# Patient Record
Sex: MALE
[De-identification: names, ages, dates, MRNs, and addresses within clinical notes are randomized per-mention and may not be internally consistent; named-entity substitution may affect disease eponyms.]

---

## 2020-07-18 ENCOUNTER — HOSPITAL ENCOUNTER (EMERGENCY)
Dept: HOSPITAL 97 - ER | Age: 40
LOS: 1 days | Discharge: ANOTHER HOSPITAL | End: 2020-07-19
Payer: SELF-PAY

## 2020-07-18 DIAGNOSIS — F17.210: ICD-10-CM

## 2020-07-18 DIAGNOSIS — S65.112A: Primary | ICD-10-CM

## 2020-07-18 DIAGNOSIS — I10: ICD-10-CM
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
## 2020-07-18 DIAGNOSIS — Z23: ICD-10-CM
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
## 2020-07-18 PROCEDURE — 99285 EMERGENCY DEPT VISIT HI MDM: CPT

## 2020-07-18 PROCEDURE — 86900 BLOOD TYPING SEROLOGIC ABO: CPT

## 2020-07-18 PROCEDURE — 96374 THER/PROPH/DIAG INJ IV PUSH: CPT

## 2020-07-18 PROCEDURE — 96361 HYDRATE IV INFUSION ADD-ON: CPT

## 2020-07-18 PROCEDURE — 85025 COMPLETE CBC W/AUTO DIFF WBC: CPT

## 2020-07-18 PROCEDURE — 90471 IMMUNIZATION ADMIN: CPT

## 2020-07-18 PROCEDURE — 80048 BASIC METABOLIC PNL TOTAL CA: CPT

## 2020-07-18 PROCEDURE — 85610 PROTHROMBIN TIME: CPT

## 2020-07-18 PROCEDURE — 90714 TD VACC NO PRESV 7 YRS+ IM: CPT

## 2020-07-18 PROCEDURE — 86850 RBC ANTIBODY SCREEN: CPT

## 2020-07-18 PROCEDURE — 36415 COLL VENOUS BLD VENIPUNCTURE: CPT

## 2020-07-18 PROCEDURE — 86901 BLOOD TYPING SEROLOGIC RH(D): CPT
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
## 2020-07-19 LAB
BUN BLD-MCNC: 11 MG/DL (ref 7–18)
GLUCOSE SERPLBLD-MCNC: 112 MG/DL (ref 74–106)
HCT VFR BLD CALC: 46 % (ref 39.6–49)
INR BLD: 1.08
LYMPHOCYTES # SPEC AUTO: 2.5 K/UL (ref 0.7–4.9)
PMV BLD: 8.8 FL (ref 7.6–11.3)
POTASSIUM SERPL-SCNC: 3.5 MMOL/L (ref 3.5–5.1)
RBC # BLD: 4.91 M/UL (ref 4.33–5.43)

## 2020-07-19 NOTE — RAD REPORT
EXAM DESCRIPTION:  RAD - Wrist Left 3 View - 7/19/2020 1:05 am

 

CLINICAL HISTORY:  laceration

Pain

 

COMPARISON:  No comparisons

 

FINDINGS:  Soft tissue swelling is present along the wrist region.  No acute fracture or foreign body
 seen.

## 2020-07-19 NOTE — ER
Nurse's Notes                                                                                     

 [facility]                                                                 

Name: Kevin Cosme                                                                                   

Age: 40 yrs                                                                                       

Sex: Male                                                                                         

: 1980                                                                                   

MRN: [id number]                                                                                   

Arrival Date: 2020                                                                          

Time: 23:25                                                                                       

Account#: [id number]                                                                            

Bed 3                                                                                             

Private MD:                                                                                       

Diagnosis: Laceration without foreign body of left wrist;Laceration of radial artery at wrist and 

  hand level of left arm                                                                          

                                                                                                  

Presentation:                                                                                     

                                                                                             

23:36 Chief complaint: Patient states: Slipped on [place] 20 min PTA. Laceration to left  ll1 

      hand, bleeding continuous. Pressure dressing applied, bleeding slowed. PMS intact.          

      Coronavirus screen: Patient denies a cough. Patient denies shortness of breath or           

      difficulty breathing. Patient denies measured and/or subjective temperature greater         

      than 100.4F prior to today's visit. Patient denies travel on a cruise ship or to a          

      country the [facility] currently lists as an affected area. Patient denies contact with known      

      and/or suspected case of COVID-19. Proceed with normal triage. Ebola Screen: Patient        

      denies travel to an Ebola-affected area in the 21 days before illness onset.                

      Complicating Factors: The patient fell landing on an outstretched hand. Initial Sepsis      

      Screen: Does the patient meet any 2 criteria? HR > 90 bpm. No. Patient's initial sepsis     

      screen is negative. Risk Assessment: Do you want to hurt yourself or someone else?          

      Patient reports no desire to harm self or others. Onset of symptoms was 2020.      

23:36 Method Of Arrival: Ambulatory                                                           ll1 

23:36 Acuity: CHRIS 3                                                                           ll1 

                                                                                                  

Historical:                                                                                       

- Allergies:                                                                                      

23:39 No Known Allergies;                                                                     ll1 

- PMHx:                                                                                           

23:39 Hypertension;                                                                           ll1 

- PSHx:                                                                                           

23:39 Cholecystectomy;                                                                        ll1 

                                                                                                  

- Immunization history:: Last tetanus immunization: unknown.                                      

- Social history:: Smoking status: Patient reports the use of cigarette tobacco                   

  products, smokes one pack cigarettes per day. Patient uses alcohol, only on a social            

  basis. Patient/guardian denies using street drugs.                                              

                                                                                                  

                                                                                                  

Screenin:30 Abuse screen: Denies threats or abuse. Nutritional screening: No deficits noted.        jb4 

      Tuberculosis screening: No symptoms or risk factors identified. Fall Risk None              

      identified.                                                                                 

                                                                                                  

Assessment:                                                                                       

23:30 General: Appears in no apparent distress. uncomfortable, Behavior is calm, cooperative, jb4 

      appropriate for age. Pain: Complains of pain in lateral aspect of left wrist Pain does      

      not radiate. Pain currently is 7 out of 10 on a pain scale. Quality of pain is              

      described as sharp. Neuro: Level of Consciousness is awake, alert, obeys commands,          

      Oriented to person, place, time, situation. Cardiovascular: Patient's skin is warm and      

      dry. Respiratory: Airway is patent Respiratory effort is even, unlabored, Respiratory       

      pattern is regular, symmetrical. GI: No signs and/or symptoms were reported involving       

      the gastrointestinal system. : No signs and/or symptoms were reported regarding the       

      genitourinary system. EENT: No signs and/or symptoms were reported regarding the EENT       

      system. Derm: Skin is dry, Skin is normal, Skin temperature is warm. Musculoskeletal:       

      Circulation, motion, and sensation intact. Range of motion: intact in all extremities.      

      Injury Description: Laceration sustained to palmar aspect of left wrist is 0.5 to 2.5       

      cm long, bleeding moderately, was sustained 30-60 minutes ago. is bleeding moderately a     

      dressing was applied.                                                                       

23:35 Reassessment: pt family called, requesting update on pt status, informed pt family that sg  

      pt is being evaluated by the provider at this time to assess for type of bleeding and       

      injury, pt family stated understanding.                                                     

                                                                                             

00:30 Reassessment: Patient appears in no apparent distress at this time. Patient and/or      jb4 

      family updated on plan of care and expected duration. Pain level reassessed. Patient is     

      alert, oriented x 3, equal unlabored respirations, skin warm/dry/pink. Laceration           

      continues to exhibit a moderate amount of pulsatile bleeding.                               

01:16 Reassessment: Report given to MOHSEN Waldrop at [facility].                           jb4 

01:56 Reassessment: Patient appears in no apparent distress at this time. Patient and/or      jb4 

      family updated on plan of care and expected duration. Pain level reassessed. Patient is     

      alert, oriented x 3, equal unlabored respirations, skin warm/dry/pink. PT transferred       

      out of ED Via  EMS. IV is patent and with NS 0.9 infusing freely. Pt has full range       

      of motion of his left fingers and left hand. Denies numbness or tingling. Has strong        

       in the left wrist.                                                                     

                                                                                                  

Vital Signs:                                                                                      

                                                                                             

23:36  / 101; Pulse 102; Resp 18; Temp 98.0; Pulse Ox 98% ; Pain 7/10;                  ll1 

                                                                                             

00:18 Weight 93.44 kg;                                                                        mw2 

00:30  / 100; Pulse 82; Resp 16; Pulse Ox 97% on R/A;                                   jb4 

01:42  / 98; Pulse 79; Resp 16; Pulse Ox 96% on R/A;                                    jb4 

                                                                                                  

ED Course:                                                                                        

                                                                                             

23:25 Patient arrived in ED.                                                                  do  

23:29 Lupillo Mcintyre PA is PHCP.                                                                cp  

23:29 Maico Singh MD is Attending Physician.                                             cp  

23:30 Stefano Casper, RN is Primary Nurse.                                                     jb4 

23:30 Patient has correct armband on for positive identification. Bed in low position. Call   jb4 

      light in reach. Side rails up X 1. Pulse ox on. NIBP on.                                    

23:38 Triage completed.                                                                       ll1 

23:39 Arm band placed on Patient placed in an exam room, on a stretcher.                      ll1 

                                                                                             

00:30 Initial lab(s) drawn, by me, sent to lab. Inserted saline lock: 18 gauge in right       rv  

      forearm, using aseptic technique. Blood collected.                                          

01:05 XRAY Wrist LEFT 3 view In Process Unspecified.                                          EDMS

01:59 No provider procedures requiring assistance completed. Patient transferred, IV remains  jb4 

      in place.                                                                                   

                                                                                                  

Administered Medications:                                                                         

00:25 Drug: Ancef 1 grams Route: IVPB; Site: right forearm;                                   jb4 

00:28 Follow up: Response: No adverse reaction; IV Status: Completed infusion                 jb4 

00:25 Drug: NS 0.9% 1000 ml Route: IV; Rate: 1 bolus; Site: right forearm;                    jb4 

01:15 Follow up: Response: No adverse reaction; IV Status: Completed infusion; IV Intake:     jb4 

      1000ml                                                                                      

00:30 Drug: Tetanus-Diphtheria Toxoid Adult 0.5 ml {: [employer]. Exp:         rv  

      2022. Lot #: [id number]. } Route: IM; Site: right deltoid;                                 

01:00 Follow up: Response: No adverse reaction                                                jb4 

00:44 Not Given (Patient Refused): morphine 4 mg IVP once; RASS on ADMIN: Combtv4, Very       jb4 

      Agttd3, Agttd2, Rstlss1, AlertClm0, Drwsy-1, Lt Sdtn-2, Mod Sdtn-3, Dp Sdtn-4,              

      UnArsble-5                                                                                  

01:55 Drug: NS 0.9% 1000 ml Route: IV; Rate: 1 bolus; Site: right forearm;                    jb4 

02:03 Follow up: Response: No adverse reaction; IV Status: Infusion continued upon transfer   jb4 

                                                                                                  

                                                                                                  

Intake:                                                                                           

01:15 IV: 1000ml; Total: 1000ml.                                                              jb4 

                                                                                                  

Outcome:                                                                                          

00:47 ER care complete, transfer ordered by MD. henriquez  

01:59 Transferred by ground EMS [facility]. to [facility], Transfer form completed.     jb4 

      X-rays sent w/ patient.                                                                     

01:59 Condition: stable                                                                           

01:59 Discharge instructions given to patient, Instructed on the need for transfer,               

      Demonstrated understanding of instructions.                                                 

02:03 Patient left the ED.                                                                    jb4 

                                                                                                  

Signatures:                                                                                       

Dispatcher [employer]                           EDMS                                                 

Erwin Reed, RN                         RN   Lupillo Wallis PA PA cp Ogletree, Danielle do Bryson, James RN                       RN   jb4                                                  

Shaji Walsh                            2                                                  

Sanket Arrington RN RN rv Lewis, Lynsay, RN                       RN   ll1                                                  

                                                                                                  

Corrections: (The following items were deleted from the chart)                                    

00:59 00:30 Reassessment: Patient appears in no apparent distress at this time. Patient       jb4 

      and/or family updated on plan of care and expected duration. Pain level reassessed.         

      Patient is alert, oriented x 3, equal unlabored respirations, skin warm/dry/pink.           

      Laceration is still oozing blood. Pressure bandage reaplied. jb4                            

                                                                                                  

**************************************************************************************************

## 2020-07-19 NOTE — EDPHYS
Physician Documentation                                                                           

 [facility]                                                                 

Name: Kevin Cosme                                                                                   

Age: 40 yrs                                                                                       

Sex: Male                                                                                         

: 1980                                                                                   

MRN: [id number]                                                                                   

Arrival Date: 2020                                                                          

Time: 23:25                                                                                       

Account#: [id number]                                                                            

Bed 3                                                                                             

Private MD:                                                                                       

ED Physician Maico Singh                                                                      

HPI:                                                                                              

                                                                                             

23:50 This 40 yrs old  Male presents to ER via Ambulatory with complaints of          cp  

      Laceration To Arm.                                                                          

23:50 The patient or guardian reports a laceration, irregular. Context: [place]       [place]  

      [place]                                           

23:50 Onset: The symptoms/episode began/occurred just prior to arrival. Associated signs and  cp  

      symptoms: Pertinent negatives: cyanosis distally, decreased sensation distally,             

      numbness distally.                                                                          

                                                                                                  

Historical:                                                                                       

- Allergies:                                                                                      

23:39 No Known Allergies;                                                                     ll1 

- PMHx:                                                                                           

23:39 Hypertension;                                                                           ll1 

- PSHx:                                                                                           

23:39 Cholecystectomy;                                                                        ll1 

                                                                                                  

- Immunization history:: Last tetanus immunization: unknown.                                      

- Social history:: Smoking status: Patient reports the use of cigarette tobacco                   

  products, smokes one pack cigarettes per day. Patient uses alcohol, only on a social            

  basis. Patient/guardian denies using street drugs.                                              

                                                                                                  

                                                                                                  

ROS:                                                                                              

23:55 Constitutional: Negative for body aches, chills, fever.                                 cp  

23:55 Cardiovascular: Negative for chest pain, palpitations.                                      

23:55 Respiratory: Negative for cough, shortness of breath, wheezing.                             

23:55 Abdomen/GI: Negative for abdominal pain.                                                    

23:55 Skin: Positive for laceration(s), of the volar and radial side of left wrist.               

23:55 Neuro: Negative for numbness, tingling, weakness.                                           

23:55 All other systems are negative.                                                             

                                                                                                  

Exam:                                                                                             

23:59 Constitutional: The patient appears in no acute distress, alert, awake, well developed, cp  

      well nourished.                                                                             

23:59 Head/Face:  Normocephalic, atraumatic.                                                  cp  

23:59 Chest/axilla: Inspection: normal.                                                           

23:59 Cardiovascular: Rate: tachycardic, Rhythm: regular, Pulses: Pulses are 2+ in right          

      radial artery and left radial artery.                                                       

23:59 Respiratory: the patient does not display signs of respiratory distress,  Respirations:     

      normal, no use of accessory muscles, no retractions.                                        

23:59 Abdomen/GI: Inspection: abdomen appears normal.                                             

23:59 Musculoskeletal/extremity: ROM: full active range of motion, in the left wrist and left     

      hand, Sensation intact.                                                                     

23:59 Skin: injury, laceration(s), the wound is approximately  2.5 cm(s), of the volar and        

      radial side of left wrist, that can be described as clean, irregular, with moderate         

      bleeding, bleeding appears pulsating and stops when pressure applied to proximal radial     

      artery.                                                                                     

23:59 Neuro: Orientation: to person, place \T\ time. Mentation: is normal.                        

                                                                                                  

Vital Signs:                                                                                      

23:36  / 101; Pulse 102; Resp 18; Temp 98.0; Pulse Ox 98% ; Pain 7/10;                  ll1 

                                                                                             

00:18 Weight 93.44 kg;                                                                        mw2 

00:30  / 100; Pulse 82; Resp 16; Pulse Ox 97% on R/A;                                   jb4 

01:42  / 98; Pulse 79; Resp 16; Pulse Ox 96% on R/A;                                    jb4 

                                                                                                  

MDM:                                                                                              

18                                                                                             

23:43 Patient medically screened.                                                               

                                                                                             

00:20 Differential diagnosis: open fracture, closed fracture, contusion, radial artery        cp  

      laceration.                                                                                 

00:45 Data reviewed: vital signs, nurses notes, I have discussed the patient's                  

      presentation/case with the attending Emergency Department Physician; and as a result, I     

      will transfer patient.                                                                      

00:50 Physician consultation: was contacted at 00:35, regarding regarding transfer, to        [facility]. patient's condition, accepting physician will be DR Palacio.               

                                                                                                  

                                                                                             

00:15 Order name: CBC with Diff; Complete Time: 01:49                                           

                                                                                             

00:15 Order name: BMP; Complete Time: 01:49                                                   cp  

                                                                                             

00:07 Order name: XRAY Wrist LEFT 3 view                                                        

                                                                                             

00:32 Order name: Type And Screen                                                               

                                                                                             

00:32 Order name: PT-INR; Complete Time: 01:49                                                cp  

                                                                                             

00:07 Order name: IV; Complete Time: 00:30                                                    cp  

                                                                                             

00:38 Order name: NPO; Complete Time: 00:43                                                   cp  

                                                                                                  

Administered Medications:                                                                         

00:25 Drug: Ancef 1 grams Route: IVPB; Site: right forearm;                                   jb4 

00:28 Follow up: Response: No adverse reaction; IV Status: Completed infusion                 jb4 

00:25 Drug: NS 0.9% 1000 ml Route: IV; Rate: 1 bolus; Site: right forearm;                    jb4 

01:15 Follow up: Response: No adverse reaction; IV Status: Completed infusion; IV Intake:     jb4 

      1000ml                                                                                      

00:30 Drug: Tetanus-Diphtheria Toxoid Adult 0.5 ml {: [employer]. Exp:         rv  

      2022. Lot #: [id number]. } Route: IM; Site: right deltoid;                                 

01:00 Follow up: Response: No adverse reaction                                                jb4 

00:44 Not Given (Patient Refused): morphine 4 mg IVP once; RASS on ADMIN: Combtv4, Very       jb4 

      Agttd3, Agttd2, Rstlss1, AlertClm0, Drwsy-1, Lt Sdtn-2, Mod Sdtn-3, Dp Sdtn-4,              

      UnArsble-5                                                                                  

01:55 Drug: NS 0.9% 1000 ml Route: IV; Rate: 1 bolus; Site: right forearm;                    jb4 

02:03 Follow up: Response: No adverse reaction; IV Status: Infusion continued upon transfer   jb4 

                                                                                                  

                                                                                                  

Disposition:                                                                                      

01:00 Chart complete.                                                                         cp  

06:16 Co-signature as Attending Physician, Maico Singh MD.                                 7 

                                                                                                  

Disposition:                                                                                      

20 00:47 Transfer ordered to [facility]. Diagnosis are Laceration without      

  foreign body of left wrist, Laceration of radial artery at wrist and hand                       

  level of left arm.                                                                              

- Reason for transfer: Higher level of care.                                                      

- Accepting physician is DR Palacio.                                                          

- Condition is Stable.                                                                            

- Problem is new.                                                                                 

- Symptoms have improved.                                                                         

                                                                                                  

                                                                                                  

                                                                                                  

Signatures:                                                                                       

Dispatcher [employer]                           EDMS                                                 

Lupillo Mcintyre PA PA cp Bryson, James RN                       RN   jb4                                                  

Sanket Arrington RN RN rv Lewis, Lynsay RN                       MOHSEN   1                                                  

Maico Singh MD MD   7                                                  

                                                                                                  

Corrections: (The following items were deleted from the chart)                                    

02:03 00:47 2020 00:47 Transfer ordered to [facility]. Diagnosis is        jb4 

      Laceration without foreign body of left wrist; Laceration of radial artery at wrist and     

      hand level of left arm. Reason for transfer: Higher level of care. Accepting physician      

      is DR Palacio. Condition is Stable. Problem is new. Symptoms have improved. florence          

18:19 00:50 Physician consultation: was contacted at 00:35, regarding regarding transfer, to  [facility]. patient's condition, accepting physician will be florence MUÑOZ                       

                                                                                                  

**************************************************************************************************

## 2025-01-03 ENCOUNTER — HOSPITAL ENCOUNTER (EMERGENCY)
Dept: HOSPITAL 97 - ER | Age: 45
Discharge: HOME | End: 2025-01-03
Payer: COMMERCIAL

## 2025-01-03 VITALS — DIASTOLIC BLOOD PRESSURE: 76 MMHG | SYSTOLIC BLOOD PRESSURE: 139 MMHG | OXYGEN SATURATION: 97 %

## 2025-01-03 VITALS — TEMPERATURE: 98.1 F

## 2025-01-03 DIAGNOSIS — F17.210: ICD-10-CM

## 2025-01-03 DIAGNOSIS — J02.9: Primary | ICD-10-CM

## 2025-01-03 PROCEDURE — 93005 ELECTROCARDIOGRAM TRACING: CPT

## 2025-01-03 PROCEDURE — 71046 X-RAY EXAM CHEST 2 VIEWS: CPT

## 2025-01-03 PROCEDURE — 99283 EMERGENCY DEPT VISIT LOW MDM: CPT

## 2025-01-03 NOTE — ER
Nurse's Notes                                                                                     

 [facility]                                                                 

Name: Kevin Cosme                                                                                   

Age: 44 yrs                                                                                       

Sex: Male                                                                                         

: 1980                                                                                   

MRN: [id number]                                                                                   

Arrival Date: 2025                                                                          

Time: 15:07                                                                                       

Account#: [id number]                                                                            

Bed 9                                                                                             

Private MD:                                                                                       

Diagnosis: Acute pharyngitis, unspecified                                                         

                                                                                                  

Presentation:                                                                                     

                                                                                             

15:22 Chief complaint: Patient states: FEELS LIKE INHALED AND SWALLOWED INSULATION WHILE IN   db  

      ATTIC. FEELS LIKE HAS DIFFICULTY BREATHING BECAUSE OF IT. Coronavirus screen: Client        

      denies travel out of the [place] in the last 14 days. At this time, the client does not        

      indicate any symptoms associated with coronavirus-19. Ebola Screen: Patient negative        

      for fever greater than or equal to 101.5 degrees Fahrenheit, and additional compatible      

      Ebola Virus Disease symptoms Patient denies exposure to infectious person. Patient          

      denies travel to an Ebola-affected area in the 21 days before illness onset. No             

      symptoms or risks identified at this time. Initial Sepsis Screen: Does the patient meet     

      any 2 criteria? No. Patient's initial sepsis screen is negative. Does the patient have      

      a suspected source of infection? No. Patient's initial sepsis screen is negative. Risk      

      Assessment: Do you want to hurt yourself or someone else? Patient reports no desire to      

      harm self or others. Onset of symptoms was 2025.                                

15:22 Method Of Arrival: Ambulatory                                                           db  

15:22 Acuity: CHRIS 3                                                                           db  

                                                                                                  

Triage Assessment:                                                                                

15:23 General: Appears in no apparent distress. uncomfortable, Behavior is calm, cooperative. db  

      Pain: Denies pain. Neuro: Level of Consciousness is awake, alert, obeys commands,           

      Oriented to person, place, time. Respiratory: Reports shortness of breath Onset: The        

      symptoms/episode began/occurred suddenly, the patient has mild shortness of breath.         

                                                                                                  

Historical:                                                                                       

- Allergies:                                                                                      

15:23 No Known Allergies;                                                                     db  

- PMHx:                                                                                           

15:23 Hypertension; Diabetes mellitus;                                                        db  

                                                                                                  

- Immunization history:: Adult Immunizations unknown.                                             

- Infectious Disease History:: Denies.                                                            

- Social history:: Smoking status: Patient reports the use of cigarette tobacco                   

  products, smokes one pack cigarettes per day.                                                   

                                                                                                  

                                                                                                  

Screenin:29 [facility] ED Fall Risk Assessment (Adult) History of falling in the last 3 months,       ld1 

      including since admission No falls in past 3 months (0 pts) Confusion or Disorientation     

      No (0 pts) Intoxicated or Sedated No (0 pts) Impaired Gait No (0 pts) Mobility Assist       

      Device Used No (0 pt) Altered Elimination No (0 pt) Score/Fall Risk Level 0 - 2 = Low       

      Risk Oriented to surroundings, Maintained a safe environment, Educated pt \T\ family on     

      fall prevention, incl call for assistance when getting out of bed, Assessed \T\             

      reinforced patient's understanding of fall precautions, Provided non-skid footwear,         

      Hourly rounding (assess needs \T\ fall precautionary measures) done, Used ambulatory aids   

      as needed (educated on \T\ assisted with), Used gait belt as appropriate. Abuse screen:     

      Denies threats or abuse. Denies injuries from another. Nutritional screening: No            

      deficits noted. Tuberculosis screening: No symptoms or risk factors identified.             

                                                                                                  

Assessment:                                                                                       

15:50 General: Appears in no apparent distress. comfortable, Behavior is calm, cooperative,   ld1 

      appropriate for age. Pain: Denies pain. Neuro: Level of Consciousness is awake, alert,      

      obeys commands, Oriented to person, place, time, situation. Cardiovascular: Capillary       

      refill < 3 seconds Patient's skin is warm and dry. Rhythm is sinus rhythm. Respiratory:     

      Airway is patent Respiratory effort is even, unlabored, Breath sounds are clear             

      bilaterally. GI: Abdomen is flat, non-distended. : No signs and/or symptoms were          

      reported regarding the genitourinary system. EENT: No signs and/or symptoms were            

      reported regarding the EENT system. Derm: No signs and/or symptoms reported regarding       

      the dermatologic system. Musculoskeletal: No signs and/or symptoms reported regarding       

      the musculoskeletal system.                                                                 

16:29 Reassessment: Patient appears in no apparent distress at this time. No changes from     ld1 

      previously documented assessment. Patient and/or family updated on plan of care and         

      expected duration. Pain level reassessed. Patient is alert, oriented x 3, equal             

      unlabored respirations, skin warm/dry/pink.                                                 

                                                                                                  

Vital Signs:                                                                                      

15:20  / 90; Pulse 102; Resp 18; Temp 98.1; Pulse Ox 96% ; Weight 97.52 kg; Height 5    db  

      ft. 9 in. ; Pain 0/10;                                                                      

16:29  / 76; Pulse 92; Resp 18; Pulse Ox 97% on R/A;                                    ld1 

15:20 Body Mass Index 31.75 (97.52 kg, 175.26 cm)                                             db  

15:20 Pain Scale: Adult                                                                       db  

                                                                                                  

ED Course:                                                                                        

15:11 Patient arrived in ED.                                                                  sj2 

15:12 [facility]                                                          dr5 

15:12 Lupillo Desai MD is Attending Physician.                                             dr5 

15:23 Triage completed.                                                                       db  

15:23 Arm band placed on left wrist.                                                          db  

15:40 Nelida Conrad, RN is Primary Nurse.                                                      ld1 

15:44 Chest Pa And Lat (2 Views) XRAY In Process Unspecified.                                 EDMS

16:29 Patient has correct armband on for positive identification. Placed in gown. Bed in low  ld1 

      position. Call light in reach. Side rails up X2. Pulse ox on. NIBP on. Door closed.         

      Noise minimized. Warm blanket given.                                                        

16:29 No provider procedures requiring assistance completed. Patient did not have IV access   ld1 

      during this emergency room visit.                                                           

                                                                                                  

Administered Medications:                                                                         

No medications were administered                                                                  

                                                                                                  

                                                                                                  

Medication:                                                                                       

16:29 VIS not applicable for this client.                                                     ld1 

                                                                                                  

Outcome:                                                                                          

16:20 Discharge ordered by MD.                                                                dr5 

16:29 Discharged to home ambulatory,                                                          ld1 

16:29 Condition: stable                                                                           

16:29 Discharge instructions given to patient, Instructed on discharge instructions, follow       

      up and referral plans. Demonstrated understanding of instructions, follow-up care,          

16:30 Patient left the ED.                                                                    ld1 

                                                                                                  

Signatures:                                                                                       

Dispatcher [employer]                           EDMS                                                 

Nelida Conrad, RN                        RN   ld1                                                  

Promise Pavon RN                    RN   db                                                   

Usman Leyva                           sj2                                                  

Vinnie José FNP-C                   FNP-Cdr5                                                  

                                                                                                  

************************************************************************************************** 0

## 2025-01-03 NOTE — EDPHYS
Physician Documentation                                                                           

 [facility]                                                                 

Name: Kevin Cosme                                                                                   

Age: 44 yrs                                                                                       

Sex: Male                                                                                         

: 1980                                                                                   

MRN: [id number]                                                                                   

Arrival Date: 2025                                                                          

Time: 15:07                                                                                       

Account#: [id number]                                                                            

Bed 9                                                                                             

Private MD:                                                                                       

ED Physician Lupillo Desai                                                                      

HPI:                                                                                              

                                                                                             

16:20 This 44 yrs old  Male presents to ER via Ambulatory with complaints of          dr5 

      Breathing Difficulty, SWALLOWED INSULATION.                                                 

16:20 The patient has shortness of breath while working. Patient is a 44-year-old male with   dr5 

      history of hypertension, diabetes coming in for sore throat after possibly inhaling         

      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 Patient reports that he had a coughing fit that      

      likely remove the insulation. Patient's only complaint is feeling funny in the back of      

      his throat..                                                                                

                                                                                                  

Historical:                                                                                       

- Allergies:                                                                                      

15:23 No Known Allergies;                                                                     db  

- PMHx:                                                                                           

15:23 Hypertension; Diabetes mellitus;                                                        db  

                                                                                                  

- Immunization history:: Adult Immunizations unknown.                                             

- Infectious Disease History:: Denies.                                                            

- Social history:: Smoking status: Patient reports the use of cigarette tobacco                   

  products, smokes one pack cigarettes per day.                                                   

                                                                                                  

                                                                                                  

ROS:                                                                                              

16:20 Constitutional: as per hpi                                                              dr5 

                                                                                                  

Exam:                                                                                             

16:20 Constitutional:  This is a well developed, well nourished patient who is awake, alert,  dr5 

      and in no acute distress. Head/Face:  Normocephalic, atraumatic. Eyes:  Pupils equal        

      round and reactive to light, extra-ocular motions intact.  Lids and lashes normal.          

      Conjunctiva and sclera are non-icteric and not injected.  Cornea within normal limits.      

      Periorbital areas with no swelling, redness, or edema. Neck:  Trachea midline, no           

      thyromegaly or masses palpated, and no cervical lymphadenopathy.  Supple, full range of     

      motion without nuchal rigidity, or vertebral point tenderness.  No Meningismus.             

      Cardiovascular:  Regular rate and rhythm with a normal S1 and S2. Normal PMI, no JVD.       

      No pulse deficits. Respiratory:  Lungs have equal breath sounds bilaterally, clear to       

      auscultation.  No rales, rhonchi or wheezes noted. No increased work of breathing, no       

      retractions or nasal flaring. Abdomen/GI:  Soft, non-tender, non-distended Skin:  Warm,     

      dry with normal turgor.  Normal color with no rashes, no lesions, and no evidence of        

      cellulitis. MS/ Extremity:  Pulses equal, no cyanosis.  Neurovascular intact.  Full,        

      normal range of motion. Neuro:  Awake and alert, GCS 15, oriented to person, place,         

      time, and situation.  Cranial nerves II-XII grossly intact.  Motor strength 5/5 in all      

      extremities.  Sensory grossly intact.  Cerebellar exam normal.  Normal gait.                

16:20 ENT: Posterior pharynx: Airway: normal, no evidence of obstruction, Tonsils: are normal     

      in appearance, erythema, that is mild,                                                      

                                                                                                  

Vital Signs:                                                                                      

15:20  / 90; Pulse 102; Resp 18; Temp 98.1; Pulse Ox 96% ; Weight 97.52 kg; Height 5    db  

      ft. 9 in. ; Pain 0/10;                                                                      

16:29  / 76; Pulse 92; Resp 18; Pulse Ox 97% on R/A;                                    ld1 

15:20 Body Mass Index 31.75 (97.52 kg, 175.26 cm)                                             db  

15:20 Pain Scale: Adult                                                                       db  

                                                                                                  

MDM:                                                                                              

15:25 Medical Screening Exam initiated                                                        dr5 

16:20 Differential diagnosis: Pneumonia, foreign body, pharyngitis. Antibiotic                dr5 

      administration: Not indicated, the patient does not have an appreciated infiltrate.         

      Data reviewed: vital signs, nurses notes. Historians other than the Patient:                

      Spouse/Significant Other: Spouse. Care significantly affected by the following chronic      

      conditions: Diabetes, Hypertension. Care significantly affected by the following Social     

      Determinants of Health: Poor access to healthcare and/or lack of insurance, Poor access     

      to transportation, Problems related to employment. Counseling: I had a detailed             

      discussion with the patient and/or guardian regarding the historical points, exam           

      findings, and any diagnostic results supporting the discharge/admit diagnosis, the          

      presence of at least one elevated blood pressure reading (>120/80) during this              

      emergency department visit, radiology results, the need for outpatient follow up, for       

      definitive care, a family practitioner, to return to the emergency department if            

      symptoms worsen or persist or if there are any questions or concerns that arise at          

      home. ED course: Patient reports his pain has resolved and is feeling better on             

      discharge. Recommended increasing hydration, alternating Tylenol Motrin as needed for       

      pain and fever, Benadryl for itching. All questions answered. Will have patient             

      follow-up with PCP. Return to ER if worsening conditions..                                  

                                                                                                  

01/03                                                                                             

15:23 Order name: Chest Pa And Lat (2 Views) XRAY; Complete Time: 15:50                       dr5 

                                                                                             

15:23 Order name: EKG; Complete Time: 15:24                                                   dr5 

                                                                                             

15:23 Order name: EKG - Nurse/Tech; Complete Time: 16:03                                      dr5 

                                                                                                  

EC:01 Rate is 91 beats/min. Rhythm is regular. QRS Axis is Normal. IL interval is normal at   dr5 

      170 msec. QRS interval is normal at 110 msec. QT interval is normal at 356 msec.            

                                                                                                  

Administered Medications:                                                                         

No medications were administered                                                                  

                                                                                                  

                                                                                                  

Disposition Summary:                                                                              

25 16:20                                                                                    

Discharge Ordered                                                                                 

 Notes:       Location: Home                                                                        
  dr5

      Condition: Stable                                                                       dr5 

      Diagnosis                                                                                   

        - Acute pharyngitis, unspecified                                                      dr5 

      Followup:                                                                               dr5 

        - With: Emergency Department                                                               

        - When: As needed                                                                          

        - Reason: Worsening of condition                                                           

      Followup:                                                                               dr5 

        - With: Private Physician                                                                  

        - When: 1 - 2 days                                                                         

        - Reason: Recheck today's complaints, Continuance of care, Re-evaluation by your           

      physician                                                                                   

      Discharge Instructions:                                                                     

        - Discharge Summary Sheet                                                             dr5 

        - Upper Respiratory Infection, Adult                                                  dr5 

      Forms:                                                                                      

        - Medication Reconciliation Form                                                      dr5 

        - Patient Portal Instructions                                                         dr5 

        - Leadership Thank You Letter                                                         dr5 

Addendum:                                                                                         

2025                                                                                        

     15:32 Co-signature as Attending Physician, Lupillo Desai MD I agree with the assessment and  c
ha

           plan of care.                                                                          

                                                                                                  

Signatures:                                                                                       

Dispatcher [employer]                           Lupillo Finnegan MD MD cha Benton, Danielle, RN                    RN   Vinnie Mack, FNP-C                   FNP-Cdr5                                                  

                                                                                                  

**************************************************************************************************

## 2025-01-03 NOTE — XMS REPORT
Continuity of Care Document



                           Created on: January 3, 2025





TERE NUÑEZ

External Reference #: [id number]

: 1980

Sex: Male



Demographics





                                        Address             [address] [place]  [address]

 

                                        Home Phone          [phone]

 

                                        Work Phone          [phone]

 

                                        Mobile Phone        [phone]

 

                                        Email Address       NONE

 

                                        Preferred Language  English

 

                                        Marital Status      Unknown

 

                                        Anglican Affiliation Unknown

 

                                        Race                Unknown

 

                                        Additional Race(s)  Unavailable

 

                                        Ethnic Group        Unknown





Author





                                        Name                Unknown

 

                                        Address             [address]

[employer]

 

                                        Address             [address]

[address]

[place]  [address]

 

                                        Phone               [phone]





Support





                          Name         Relationship Address      Phone

 

                                MIRANDA NUÑEZ  SP              [address] [place]  [address] [phone]

 

                          MIRANDA NUÑEZ           Unknown      [phone]

 

                          LONA GONZALEZ            Unknown      Unavailable

 

                          Unavailable  Mother       Unknown      [phone]





Care Team Providers





                                Care Team Member Name Role            Phone

 

                                KATYA BROWN  Primary Care Physician Unavailab

MELISSA Hoff Attending Clinician Unavailable

 

                                Epifanio Diaz NP Attending Clinician [phone] 

 

                                Melissa Oden NP Attending Clinician [phone]

[phone]

 

                                CARLITA GOMEZ Attending Clinician Un

available

 

                                EPIFANIO DIAZ Admitting Clinician Unavailable

 

                                PETRONA NEIL Admitting Clinician Unashelly lott







Payers





                    Payer Name Policy Type Policy Number Effective Date Expirati

on Date Source

 

                                                    [employer] 

PPO/POS                                 [id number]           2024 

00:00:00                                            







Problems





                                                    Condition 

Name                                    Condition 

Details                                 Condition 

Category                  Status                    Onset 

Date                                    Resolution 

Date                                    Last 

Treatment 

Date                                    Treating 

Clinician                 Comments                  Source

 

                                                    Enterocoli

tis                                     Enterocoli

tis                 Disease             Active               

00:00:

00                                                               [facility]







Allergies, Adverse Reactions, Alerts





                                                    Allergy 

Name                                    Allergy 

Type            Status          Severity        Reaction(s)     Onset 

Date                                    Inactive 

Date                                    Treating 

Clinician                 Comments                  Source

 

                                                    NO KNOWN 

ALLERGIE

S                                       Drug 

Class   Active                                                  [facility]







Social History





                    Social Habit Start Date Stop Date Quantity  Comments  Source

 

                    Sexual orientation                                         U

[facility]

 

                                        Sex assigned at birth 1980 

00:00:00                                1980 

00:00:00                                                    [facility]







                          Smoking Status Start Date   Stop Date    Source

 

                                                    Tobacco smoking consumption 

unknown                                                     [facility]







Medications





                                                    Ordered 

Medication 

Name                                    Filled 

Medication 

Name                                    Start 

Date                                    Stop 

Date                                    Current 

Medication?                             Ordering 

Clinician       Indication      Dosage          Frequency       Signature 

(SIG)               Comments            Components          Source

 

                                                    dicyclomine 

(BENTYL) 

injection 

20 mg                                               2024-0

8-10 

01:45:

00                                      2024-

08-10 

01:00

:00        No                               20mg                  20 mg, 

Intramuscu

lar, ONCE 

NOW, 1 

dose, On 

24 

at 204, 

Routine                                                     [facility]

 

                                                    diphenhydrA

MINE:lidoca

ine 2% 

viscous:maa

lox 1:1:1 

(FIRST-MOUT

HWASH BLM) 

oral 

suspension 

15 mL                                               2024-0

8-10 

01:00:

00                                      2024-

08-10 

01:00

:00        No                               15mL                  15 mL, 

Oral, 

ONCE, 1 

dose, On 

24 

at , 

Routine                                                     [facility]

 

                                                    iopamidol 

(ISOVUE 

370-500 mL) 

injection 

85 mL                                               2024-0

8-10 

00:00:

00                                      2024-

08-10 

00:15

:00        No                    [id number]  85mL                  85 mL, 

Intravenou

s, ONCE, 1 

dose, On 

24 

at 1915, 

Routine                                                     [facility]

 

                                                    ondansetron 

(ZOFRAN 

(PF)) 

injection 4 

mg                                                   

22:30:

00                                       

23:05

:00        No                               4mg                   4 mg, Slow 

IV Push, 

ONCE, 1 

dose, On 

24 

at 1730, 

[facility]

 

                                                    famotidine 

(PEPCID 

(PF)) 

injection 

20 mg                                                

22:30:

00                                       

23:05

:00        No                               20mg                  20 mg, 

Slow IV 

Push, 

ONCE, 1 

dose, On 

24 

at 1730, 

[facility]

 

                                                    dicyclomine 

20 mg 

tablet                                               

00:00:

00                  Yes                 [id number]  20mg                Take 1 

tablet by 

mouth 4 

(four) 

times 

daily as 

needed for 

Abdominal 

pain.                                                       [facility]

 

                                                    metroNIDAZO

 mg 

tablet                                               

00:00:

00                  Yes                 [id number]  500mg               Take 1 

tablet by 

mouth in 

the 

morning 

and 1 

tablet in 

the 

evening.                                                    [facility]

 

                                                    ciprofloxac

in HCl 500 

mg tablet                                            

00:00:

00                                       

04:59

:00        No                    [id number]   500mg                 Take 1 

tablet by 

mouth in 

the 

morning 

and 1 

tablet in 

the 

evening. 

Do all 

this for 7 

days.                                                       [facility]







Vital Signs





                      Vital Name Observation Time Observation Value Comments   S

ource

 

                                                    Systolic blood 

pressure        2024-08-10 02:00:00 117 mm[Hg]                      [facility]

 

                                                    Diastolic blood 

pressure        2024-08-10 02:00:00 83 mm[Hg]                       [facility]

 

                      Heart rate 2024-08-10 02:00:00 74 /min               [facility]

 

                      Body temperature 2024-08-10 02:00:00 37.44 Kenia            

 [facility]

 

                      Respiratory rate 2024-08-10 02:00:00 24 /min              

 [facility]

 

                                                    Oxygen saturation in 

Arterial blood by 

Pulse oximetry  2024-08-10 02:00:00 96 /min                         [facility]

 

                      Body height 2024 21:58:00 175.3 cm              [facility]

 

                      Body weight 2024 21:58:00 90.719 kg             [facility]

 

                      BMI        2024 21:58:00 29.53 kg/m2            [facility]







Procedures





                          Procedure    Date / Time Performed Performing Clinicia

n Source

 

                                                    CT ABDOMEN PELVIS W 

CONTRAST            2024-08-10 00:07:55 Epifanio Diaz    [facility]

 

                          LIPASE       2024 23:04:00 Epifanio Diaz [facility]

 

                          TROPONIN I   2024 23:04:00 Epifanio Diaz [facility]

 

                                                    COMP. METABOLIC PANEL 

([id number])             2024 23:04:00 Epifanio Diaz    [facility]

 

                          CBC WITH DIFF 2024 23:04:00 Epifanio Diaz [facility]

 

                          URINALYSIS   2024 23:04:00 Epifanio Diaz [facility]







Encounters





                                                    Start 

Date/Time                               End 

Date/Time                               Encounter 

Type                                    Admission 

Type                                    Attending 

Clinicians                              Care 

Facility                                Care 

Department                              Encounter 

ID                                      Source

 

                                                    2024 

17:01:00                                2024 

22:02:00            Emergency           MELISSA HUGHES       [facility]            ERT             [id number]      [facility]

 

                                                    2024 

17:01:00                                2024 

22:02:00            Emergency                               Epifanio Diaz Katherine                               [facility] AT 

[facility]                                 [id number]

[id number]

[id number]

[id number]

084                       [id number]                 [facility]

 

                                                    2020 

01:46:00                                2020 

04:19:00            Emergency           E                   CARLITA GOMEZ          [facility]            [id number]            [facility]







Results





                                                    Test 

Description                             Test 

Time                                    Test 

Comments                  Results                   Result 

Comments                                Source

 

                                                    CT ABDOMEN 

PELVIS W 

CONTRAST                                2024-08-

10 

02:31:50                                            ORDERING 

PHYSICIAN:EPIFANIO BOWMAN 

CLINICAL INFORMATION: ? 

Abdominal 

abscess/infection 

suspected COMPARISON: 

None Technique: ? CT of 

the abdomen and pelvis 

was performed after 

theadministration of IV 

contrast. No p.o. 

contrast was used. 

Multiplanarreformats 

were also obtained. This 

study was performed 

according to 

ALARAprinciple for 

radiation dose 

reduction. Findings: 

There is no evidence of 

obstructive uropathy. No 

suspicious 

renalparenchymal 

abnormalities are seen. 

Liver shows evidence of 

fattyinfiltration. 

Gallbladder surgically 

absent. Spleen, adrenal 

glands, andpancreas show 

no evidence of gross 

abnormalities. There is 

no bowelobstruction. 

Appendix is normal. 

There is mild mucosal 

edema involving 

thedistal small bowel 

and proximal colon. No 

free intraperitoneal air 

or fluidare present. No 

pathologically enlarged 

lymph nodes are seen. 

There is a1.4 cm 

noncalcified pulmonary 

nodule seen posteriorly 

in the right lungbase. 

There are no suspicious 

focal osseous lesions.                              [id number] 
23:33:38* 



                      Test Item  Value      Reference Range Interpretation Comme

nts

 

                                                    NA (test code = 

6863609498)     139 mmol/L      135-145                         

 

                                                    K (test code = 

4018560358)     3.9 mmol/L      3.5-5.0                         

 

                                                    CL (test code = 

9392579429)     103 mmol/L                                

 

                                                    CO2 TOTAL (test code = 

2944879855)     25 mmol/L       23-31                           

 

                                                    AGAP (test code = 

3930269796)     11              2-16                            

 

                                                    BUN (test code = 

8497763765)     13 mg/dL        7-23                            

 

                                                    GLUCOSE (test code = 

7183091852)     126 mg/dL                 H               

 

                                                    CREATININE (test code = 

2160-0)         0.79 mg/dL      0.60-1.25                       

 

                                                    TOTAL BILI (test code = 

6896600029)     0.8 mg/dL       0.1-1.1                         

 

                                                    CALCIUM (test code = 

2754494679)     9.4 mg/dL       8.6-10.6                        

 

                                                    T PROTEIN (test code = 

1905259835)     8.7 g/dL        6.3-8.2         H               

 

                                                    ALBUMIN (test code = 

6177870708)     4.9 g/dL        3.5-5.0                         

 

                                                    ALK PHOS (test code = 

9960588376)     81 U/L                                    

 

                                                    ALTv (test code = 

1742-6)         35 U/L          5-50                            

 

                                                    AST(SGOT) (test code = 

8687339807)     30 U/L          13-40                           

 

                                                    eGFR (test code = 

04940-4)        113.0           mL/min/1.73m2                   CKD-EPI eGFR 

(). Assuming 

creatinine has been 

stable day-to-day 

for at least three 

months, the eGFR 

indicates Category 

G1 (>= 90 

mL/min/1.73 m2)

 

                                                    Lab Interpretation (test 

code = 50169-8) Abnormal                                        





[facility] 



                      Test Item  Value      Reference Range Interpretation Comme

nts

 

                      LIPASE (test code = 2859478434) 69 U/L     0-220          

       

 

                                                    Lab Interpretation (test cod

e = 

62478-0)        Normal                                          





[id number] 23:22:52* 



                      Test Item  Value      Reference Range Interpretation Comme

nts

 

                                                    WBC (test code = 

6690-2)         10.77           4.20-10.70      H               

 

                                                    RBC (test code = 

789-8)          5.26            4.26-5.52                       

 

                                                    HGB (test code = 

718-7)          16.7 g/dL       12.2-16.4       H               

 

                                                    HCT (test code = 

4544-3)         48.3 %          38.4-49.3                       

 

                                                    MCV (test code = 

787-2)          91.8 fL         81.7-95.6                       

 

                                                    MCH (test code = 

785-6)          31.7 pg         26.1-32.7                       

 

                                                    MCHC (test code = 

786-4)          34.6 g/dL       31.2-35.0                       

 

                                                    RDW-SD (test code = 

98609-5)        42.0 fL         38.5-51.6                       

 

                                                    RDW-CV (test code = 

788-0)          12.5 %          12.1-15.4                       

 

                                                    PLT (test code = 

777-3)          243             150-328                         

 

                                                    MPV (test code = 

90210-3)        9.9 fL          9.8-13.0                        

 

                                                    NRBC/100 WBC (test 

code = 2544981105) 0.0             0.0-10.0                        

 

                                                    NRBC x10^3 (test code 

= 3968542317)                   See_Comment                     [Automated messa

ge] 

The system which 

generated this 

result transmitted 

reference range: 

10*3/?L. The 

reference range was 

not used to 

interpret this 

result as 

normal/abnormal.

 

                                                    GRAN MAT (NEUT) % 

(test code = 770-8) 78.5 %                                          

 

                                                    IMM GRAN % (test code 

= 4374142823)   0.40 %                                          

 

                                                    LYMPH % (test code = 

736-9)          13.4 %                                          

 

                                                    MONO % (test code = 

5905-5)         6.9 %                                           

 

                                                    EOS % (test code = 

713-8)          0.5 %                                           

 

                                                    BASO % (test code = 

706-2)          0.3 %                                           

 

                                                    GRAN MAT x10^3(ANC) 

(test code = 

4505657885)     8.47 10*3/uL    1.99-6.95       H               

 

                                                    IMM GRAN x10^3 (test 

code = 7384462153) 0.04 10*3/uL    0.00-0.06                       

 

                                                    LYMPH x10^3 (test code 

= 731-0)        1.44 10*3/uL    1.09-3.23                       

 

                                                    MONO x10^3 (test code 

= 742-7)        0.74 10*3/uL    0.36-1.02                       

 

                                                    EOS x10^3 (test code = 

711-2)          0.05 10*3/uL    0.06-0.53       L               

 

                                                    BASO x10^3 (test code 

= 704-7)        0.03 10*3/uL    0.01-0.09                       

 

                                                    Lab Interpretation 

(test code = 61956-5) Abnormal                                        





[facility]

## 2025-01-03 NOTE — RAD REPORT
Procedure: Chest Pa And Lat (2 Views)



HISTORY: Cough



COMPARISON: 2017



FINDINGS:



The lungs appear clear of acute infiltrate.



No significant pleural effusion noted.



The heart is normal size.



IMPRESSION:



No acute abnormality is displayed.



Reported By: Fransico King 

Electronically Signed:  1/3/2025 3:45 PM

## 2025-01-13 NOTE — EKG
Test Date:    2025-01-03               Test Time:    16:01:19

Technician:   ROS BAKER                                   

                                                     

MEASUREMENT RESULTS:                                       

Intervals:                                           

Rate:         91                                     

RI:           170                                    

QRSD:         110                                    

QT:           356                                    

QTc:          437                                    

Axis:                                                

P:            60                                     

RI:           170                                    

QRS:          -82                                    

T:            24                                     

                                                     

INTERPRETIVE STATEMENTS:                                       

                                                     

Normal sinus rhythm

Right bundle branch block

Left anterior fascicular block

*** Bifascicular block ***

Septal infarct, age undetermined

Abnormal ECG

Compared to ECG 05/31/2017 19:56:04

Right bundle-branch block now present

Bifascicular block now present

Myocardial infarct finding now present

Incomplete right bundle-branch block no longer present



Electronically Signed On 01-13-25 11:06:19 CST by Mehrdad Hemphill